# Patient Record
Sex: FEMALE | Race: WHITE | Employment: FULL TIME | URBAN - METROPOLITAN AREA
[De-identification: names, ages, dates, MRNs, and addresses within clinical notes are randomized per-mention and may not be internally consistent; named-entity substitution may affect disease eponyms.]

---

## 2017-07-07 PROBLEM — E03.9 ACQUIRED HYPOTHYROIDISM: Status: ACTIVE | Noted: 2017-07-07

## 2020-05-19 PROBLEM — E66.811 OBESITY (BMI 30.0-34.9): Status: ACTIVE | Noted: 2020-05-19

## 2020-05-19 PROBLEM — E66.9 OBESITY (BMI 30.0-34.9): Status: ACTIVE | Noted: 2020-05-19

## 2020-05-19 PROBLEM — N64.52 BLOODY DISCHARGE FROM LEFT NIPPLE: Status: ACTIVE | Noted: 2020-05-19

## 2020-06-23 PROBLEM — E78.00 ELEVATED LDL CHOLESTEROL LEVEL: Status: ACTIVE | Noted: 2020-06-23

## 2020-06-23 PROBLEM — Z51.81 ENCOUNTER FOR THERAPEUTIC DRUG MONITORING: Status: ACTIVE | Noted: 2020-06-23

## 2020-06-23 PROBLEM — E66.9 OBESITY (BMI 30-39.9): Status: ACTIVE | Noted: 2020-06-23

## 2024-07-17 ENCOUNTER — APPOINTMENT (OUTPATIENT)
Dept: ULTRASOUND IMAGING | Facility: HOSPITAL | Age: 54
End: 2024-07-17
Attending: EMERGENCY MEDICINE
Payer: COMMERCIAL

## 2024-07-17 ENCOUNTER — HOSPITAL ENCOUNTER (EMERGENCY)
Facility: HOSPITAL | Age: 54
Discharge: HOME | End: 2024-07-17
Attending: EMERGENCY MEDICINE
Payer: COMMERCIAL

## 2024-07-17 VITALS
DIASTOLIC BLOOD PRESSURE: 101 MMHG | SYSTOLIC BLOOD PRESSURE: 159 MMHG | OXYGEN SATURATION: 98 % | BODY MASS INDEX: 29.99 KG/M2 | TEMPERATURE: 98 F | HEART RATE: 75 BPM | RESPIRATION RATE: 20 BRPM | HEIGHT: 65 IN | WEIGHT: 180 LBS

## 2024-07-17 DIAGNOSIS — M79.605 PAIN OF LEFT LOWER EXTREMITY: Primary | ICD-10-CM

## 2024-07-17 PROCEDURE — 93971 EXTREMITY STUDY: CPT | Performed by: EMERGENCY MEDICINE

## 2024-07-17 PROCEDURE — 99284 EMERGENCY DEPT VISIT MOD MDM: CPT

## 2024-07-17 PROCEDURE — 99285 EMERGENCY DEPT VISIT HI MDM: CPT

## 2024-07-17 RX ORDER — METHYLPREDNISOLONE 4 MG/1
TABLET ORAL
Qty: 1 EACH | Refills: 0 | Status: SHIPPED | OUTPATIENT
Start: 2024-07-17

## 2024-07-17 RX ORDER — CYCLOBENZAPRINE HCL 10 MG
10 TABLET ORAL 3 TIMES DAILY PRN
Qty: 20 TABLET | Refills: 0 | Status: SHIPPED | OUTPATIENT
Start: 2024-07-17 | End: 2024-07-24

## 2024-07-17 RX ORDER — TRAMADOL HYDROCHLORIDE 50 MG/1
50 TABLET ORAL EVERY 6 HOURS PRN
Qty: 10 TABLET | Refills: 0 | Status: SHIPPED | OUTPATIENT
Start: 2024-07-17 | End: 2024-07-24

## 2024-07-17 NOTE — ED QUICK NOTES
Pt states having lt knee burning pain x 3 days. States radiates to lt thigh. Denies injury or trama. States worse with sitting.

## 2024-07-17 NOTE — ED PROVIDER NOTES
Patient Seen in: [facility] Emergency Department    History     Chief Complaint   Patient presents with    Leg or Foot Injury       HPI    History is provided by patient/independent historian: Patient  54 year old female with history of hyperlipidemia, hypothyroidism, here with complaints of left knee pain for the last 2 days, now radiating up into her glutes.  Her family friend told her to ice her lower back and see if she had improvement which it did, but she was concerned for what could be causing this pain.  No obvious swelling.  No known injury.  She describes it as a burning sensation just above her knee and behind her leg.  No chest pain or shortness of breath.    History reviewed.   Past Medical History:    Hyperlipidemia    Hypothyroid    Obesity         History reviewed. History reviewed. No pertinent surgical history.      Home Medications reviewed :  (Not in a hospital admission)        History reviewed.   Social History     Socioeconomic History    Marital status: Single   Tobacco Use    Smoking status: Never    Smokeless tobacco: Never   Vaping Use    Vaping status: Never Used   Substance and Sexual Activity    Alcohol use: No     Comment: socially on the weekends, wine    Drug use: No    Sexual activity: Yes         ROS  Review of Systems   Respiratory:  Negative for shortness of breath.    Cardiovascular:  Negative for chest pain.   Musculoskeletal:  Positive for arthralgias, back pain and myalgias.   All other systems reviewed and are negative.     All other pertinent organ systems are reviewed and are negative.      Physical Exam     ED Triage Vitals [07/17/24 1643]   BP (!) 159/101   Pulse 75   Resp 20   Temp 97.5 °F (36.4 °C)   Temp src Temporal   SpO2 98 %   O2 Device None (Room air)     Vital signs reviewed.      Physical Exam  Vitals and nursing note reviewed.   Cardiovascular:      Pulses: Normal pulses.   Pulmonary:      Effort: No respiratory distress.   Abdominal:      General:  There is no distension.   Musculoskeletal:      Comments: No lumbar spinal tenderness, pelvis is stable, positive ipsilateral straight leg test of the left lower extremity, no obvious swelling to the left lower extremity, patellar and quadriceps tendons intact, tenderness to palpation in popliteal fossa, distal leg warm   Neurological:      Mental Status: She is alert.         ED Course       Labs:   Labs Reviewed - No data to display      My EKG Interpretation:   As reviewed and Interpreted by me      Imaging Results Available and Reviewed while in ED:   US VENOUS DOPPLER LEG LEFT - DIAG IMG (CPT=93971)    Result Date: 7/17/2024  CONCLUSION: No evidence of left lower extremity DVT.    Dictated by (CST): Bhavik Felipe MD on 7/17/2024 at 5:55 PM     Finalized by (CST): Bhavik Felipe MD on 7/17/2024 at 5:57 PM         My review and independent interpretation of US images: no DVT. Radiology report corroborates this in addition to other details as reported by them.      Decision rules/scores evaluated: none      Diagnostic labs/tests considered but not ordered: CBC, BMP, ddimer    ED Medications Administered: Medications - No data to display             MDM       Medical Decision Making      Differential Diagnosis: After obtaining the patient's history, performing the physical exam and reviewing the diagnostics, multiple initial diagnoses were considered based on the presenting problem including dvt, cellulitis, compartment syndrome, arthritis, sciatica    External document review: I personally reviewed available external medical records for any recent pertinent discharge summaries, testing, and procedures - the findings are as follows: 1/19/24 visit with Dr. Loza for L flank pain    Complicating Factors: The patient already  has a past medical history of Hyperlipidemia, Hypothyroid, and Obesity. to contribute to the complexity of this ED evaluation.    Procedures performed: none    Discussed management with  physician/appropriate source: none    Considered admission/deescalation of care for: none    Social determinants of health affecting patient care: none    Prescription medications considered: tramadol, flexeril, medrol dosepak, discussed continuing current medication regimen    The patient requires continuous monitoring for: leg pain    Shared decision making: discussed possible admission        Disposition and Plan     Clinical Impression:  1. Pain of left lower extremity        Disposition:  Discharge    Follow-up:  Ariadna Almaguer MD  [address] [place] [address]  [phone]    Follow up        Medications Prescribed:  Current Discharge Medication List        START taking these medications    Details   methylPREDNISolone (MEDROL) 4 MG Oral Tablet Therapy Pack Dosepack: take as directed  Qty: 1 each, Refills: 0      cyclobenzaprine 10 MG Oral Tab Take 1 tablet (10 mg total) by mouth 3 (three) times daily as needed for Muscle spasms.  Qty: 20 tablet, Refills: 0      traMADol 50 MG Oral Tab Take 1 tablet (50 mg total) by mouth every 6 (six) hours as needed.  Qty: 10 tablet, Refills: 0    Associated Diagnoses: Pain of left lower extremity

## 2025-06-14 ENCOUNTER — APPOINTMENT (OUTPATIENT)
Dept: GENERAL RADIOLOGY | Facility: HOSPITAL | Age: 55
End: 2025-06-14
Payer: COMMERCIAL

## 2025-06-14 ENCOUNTER — HOSPITAL ENCOUNTER (EMERGENCY)
Facility: HOSPITAL | Age: 55
Discharge: HOME | End: 2025-06-14
Attending: EMERGENCY MEDICINE
Payer: COMMERCIAL

## 2025-06-14 VITALS
RESPIRATION RATE: 20 BRPM | BODY MASS INDEX: 28.32 KG/M2 | SYSTOLIC BLOOD PRESSURE: 137 MMHG | DIASTOLIC BLOOD PRESSURE: 56 MMHG | OXYGEN SATURATION: 96 % | HEIGHT: 65 IN | TEMPERATURE: 98 F | HEART RATE: 90 BPM | WEIGHT: 170 LBS

## 2025-06-14 DIAGNOSIS — S93.401A MODERATE RIGHT ANKLE SPRAIN, INITIAL ENCOUNTER: Primary | ICD-10-CM

## 2025-06-14 PROCEDURE — 99284 EMERGENCY DEPT VISIT MOD MDM: CPT

## 2025-06-14 PROCEDURE — 73610 X-RAY EXAM OF ANKLE: CPT

## 2025-06-14 PROCEDURE — 73650 X-RAY EXAM OF HEEL: CPT

## 2025-06-14 RX ORDER — HYDROCODONE BITARTRATE AND ACETAMINOPHEN 5; 325 MG/1; MG/1
1 TABLET ORAL EVERY 6 HOURS PRN
Qty: 10 TABLET | Refills: 0 | Status: SHIPPED | OUTPATIENT
Start: 2025-06-14

## 2025-06-14 NOTE — ED PROVIDER NOTES
Patient Seen in: [facility] Emergency Department        History  Chief Complaint   Patient presents with    Ankle Pain     Stated Complaint: R ankle injury    Subjective:   HPI            55-year-old female here with her sister-in-law for evaluation of a right ankle injury after she was training some dog stepped off the side of a sidewalk and inverted her right ankle.  She was able to stand for a while but then had trouble after icing at home so came to the ER.  No prior injuries to this ankle.  No other injury reported with her fall.      Objective:     Past Medical History:    Hyperlipidemia    Hypothyroid    Obesity              History reviewed. No pertinent surgical history.             Social History     Socioeconomic History    Marital status: Single   Tobacco Use    Smoking status: Never    Smokeless tobacco: Never   Vaping Use    Vaping status: Never Used   Substance and Sexual Activity    Alcohol use: No     Comment: socially on the weekends, wine    Drug use: No    Sexual activity: Yes   Social History Narrative    , 6 children, 10 grandchildren, works managing a [employer]     Social Drivers of Health      Received from [facility]                                Physical Exam    ED Triage Vitals [06/14/25 1418]   /87   Pulse 92   Resp 18   Temp 97.9 °F (36.6 °C)   Temp src Temporal   SpO2 95 %   O2 Device None (Room air)       Current Vitals:   Vital Signs  BP: 137/56  Pulse: 90  Resp: 20  Temp: 97.8 °F (36.6 °C)  Temp src: Oral  MAP (mmHg): 100    Oxygen Therapy  SpO2: 96 %  O2 Device: None (Room air)            Physical Exam  Constitutional: Oriented to person, place, and time.  Appears well-developed. No distress.   Head: Normocephalic and atraumatic.   Eyes: Conjunctivae are normal. Pupils are equal, round, and reactive to light.   Cardiovascular: Normal rate, regular rhythm and intact distal pulses.    Musculoskeletal: Moderate focal pain to palpation over and around  the lateral malleolus with mild swelling.  No high fibular pain.  No medial pain.  No obvious foot trauma.  Dorsalis pedis pulse strong.  Pain obviously with plantarflexion and inversion the most.  Neurological: Alert and oriented to person, place, and time.   Skin: Skin is warm and dry.     Nursing note and vitals reviewed.    Differential diagnosis includes ankle sprain, distal fibula fracture.          ED Course  Labs Reviewed - No data to display       XR HEEL (CALCANEUS) (MIN 2 VIEWS), RIGHT (CPT=73650)  Result Date: 6/14/2025  PROCEDURE: XR HEEL (CALCANEUS) (MIN 2 VIEWS), RIGHT (CPT=73650)  COMPARISON: [facility], XR ANKLE (MIN 3 VIEWS), RIGHT (CPT=73610), 6/14/2025, 2:23 PM.  INDICATIONS: R ankle injury due to rolling of ankle today.  TECHNIQUE: 2 views were obtained without weightbearing technique.          CONCLUSION:  1. Calcaneal spurs.  No acute fracture or suspicious bone lesion. 2. Chronic corticated osseous density medial hindfoot. 3. Please see separate x-ray report regarding right ankle.    Dictated by (CST): Ronny Beard MD on 6/14/2025 at 3:30 PM     Finalized by (CST): Ronny Beard MD on 6/14/2025 at 3:32 PM          XR ANKLE (MIN 3 VIEWS), RIGHT (CPT=73610)  Result Date: 6/14/2025  PROCEDURE: XR ANKLE (MIN 3 VIEWS), RIGHT (CPT=73610)  COMPARISON: None.  INDICATIONS: R ankle injury today after rolling ankle.  TECHNIQUE: 3 views were obtained.   FINDINGS:  BONES: Cortical irregularity along the styloid of lateral malleolus is probably related to old trauma.  No acute fracture or subluxation.  Minor degenerative changes of the ankle.  Calcaneal spurs. SOFT TISSUES: Soft tissue swelling.  Calcified pretibial phleboliths likely related to chronic venous stasis. EFFUSION: None visible. OTHER: Negative.         CONCLUSION:  1. Old posttraumatic changes along the styloid tip of the lateral malleolus.  No acute fracture. 2. Lateral soft tissue swelling compatible with ankle sprain.     Dictated by (CST): Ronny Beard MD on 6/14/2025 at 3:28 PM     Finalized by (CST): Ronny Beard MD on 6/14/2025 at 3:30 PM                            [facility]           Medical Decision Making  Imaging as noted.  Recommended continued elevation and ice.  Anti-inflammatories.  Will send something stronger to the pharmacy if needed.  Patient should follow-up with Ortho later next week if symptoms have not improved or progressed.  She will come back here with worsening or change.  She was placed in an Ace wrap and Aircast and given crutches.  Sister-in-law is driving.    Problems Addressed:  Moderate right ankle sprain, initial encounter: acute illness or injury    Amount and/or Complexity of Data Reviewed  Radiology: ordered and independent interpretation performed. Decision-making details documented in ED Course.     Details: By my gross and independent review of the right foot and ankle x-ray I do not appreciate gross and obvious evidence of fracture or significant bony malalignment    Risk  OTC drugs.  Prescription drug management.        Disposition and Plan     Clinical Impression:  1. Moderate right ankle sprain, initial encounter         Disposition:  Discharge  6/14/2025  3:38 pm    Follow-up:  Ariadna Almaguer MD  [address]  [address]  [place] [id number] [phone]    Call      Mohamud Schwartz MD  [address]  [place] [address]  [phone]    Schedule an appointment as soon as possible for a visit in 5 day(s)  As needed, If symptoms worsen          Medications Prescribed:  Discharge Medication List as of 6/14/2025  3:45 PM        START taking these medications    Details   HYDROcodone-acetaminophen 5-325 MG Oral Tab Take 1 tablet by mouth every 6 (six) hours as needed., Normal, Disp-10 tablet, R-0                   Supplementary Documentation:

## 2025-06-14 NOTE — ED INITIAL ASSESSMENT (HPI)
Pt arrives through triage with       complaints of right ankle and heel pain after missing a step/ and rolling her ankle earlier today.    Denies thinners